# Patient Record
Sex: MALE | Race: WHITE | NOT HISPANIC OR LATINO | Employment: FULL TIME | URBAN - METROPOLITAN AREA
[De-identification: names, ages, dates, MRNs, and addresses within clinical notes are randomized per-mention and may not be internally consistent; named-entity substitution may affect disease eponyms.]

---

## 2023-09-18 ENCOUNTER — OFFICE VISIT (OUTPATIENT)
Dept: FAMILY MEDICINE CLINIC | Facility: CLINIC | Age: 28
End: 2023-09-18
Payer: COMMERCIAL

## 2023-09-18 ENCOUNTER — LAB (OUTPATIENT)
Dept: LAB | Facility: HOSPITAL | Age: 28
End: 2023-09-18
Payer: COMMERCIAL

## 2023-09-18 VITALS
OXYGEN SATURATION: 97 % | HEART RATE: 71 BPM | DIASTOLIC BLOOD PRESSURE: 70 MMHG | WEIGHT: 192 LBS | BODY MASS INDEX: 28.44 KG/M2 | HEIGHT: 69 IN | TEMPERATURE: 97.1 F | SYSTOLIC BLOOD PRESSURE: 124 MMHG

## 2023-09-18 DIAGNOSIS — Z13.220 SCREENING FOR HYPERLIPIDEMIA: ICD-10-CM

## 2023-09-18 DIAGNOSIS — Z76.89 ENCOUNTER TO ESTABLISH CARE WITH NEW DOCTOR: ICD-10-CM

## 2023-09-18 DIAGNOSIS — M25.561 RIGHT KNEE PAIN, UNSPECIFIED CHRONICITY: Primary | ICD-10-CM

## 2023-09-18 DIAGNOSIS — Z83.3 FAMILY HISTORY OF DIABETES MELLITUS: ICD-10-CM

## 2023-09-18 LAB
ALBUMIN SERPL-MCNC: 4.6 G/DL (ref 3.5–5.2)
ALBUMIN/GLOB SERPL: 1.9 G/DL
ALP SERPL-CCNC: 76 U/L (ref 39–117)
ALT SERPL W P-5'-P-CCNC: 28 U/L (ref 1–41)
ANION GAP SERPL CALCULATED.3IONS-SCNC: 10.8 MMOL/L (ref 5–15)
AST SERPL-CCNC: 37 U/L (ref 1–40)
BILIRUB SERPL-MCNC: 0.9 MG/DL (ref 0–1.2)
BUN SERPL-MCNC: 11 MG/DL (ref 6–20)
BUN/CREAT SERPL: 10.4 (ref 7–25)
CALCIUM SPEC-SCNC: 9.1 MG/DL (ref 8.6–10.5)
CHLORIDE SERPL-SCNC: 104 MMOL/L (ref 98–107)
CHOLEST SERPL-MCNC: 144 MG/DL (ref 0–200)
CO2 SERPL-SCNC: 25.2 MMOL/L (ref 22–29)
CREAT SERPL-MCNC: 1.06 MG/DL (ref 0.76–1.27)
EGFRCR SERPLBLD CKD-EPI 2021: 99.3 ML/MIN/1.73
GLOBULIN UR ELPH-MCNC: 2.4 GM/DL
GLUCOSE SERPL-MCNC: 90 MG/DL (ref 65–99)
HBA1C MFR BLD: 4.9 % (ref 4.8–5.6)
HDLC SERPL-MCNC: 35 MG/DL (ref 40–60)
LDLC SERPL CALC-MCNC: 93 MG/DL (ref 0–100)
LDLC/HDLC SERPL: 2.63 {RATIO}
POTASSIUM SERPL-SCNC: 4.3 MMOL/L (ref 3.5–5.2)
PROT SERPL-MCNC: 7 G/DL (ref 6–8.5)
SODIUM SERPL-SCNC: 140 MMOL/L (ref 136–145)
TRIGL SERPL-MCNC: 84 MG/DL (ref 0–150)
VLDLC SERPL-MCNC: 16 MG/DL (ref 5–40)

## 2023-09-18 PROCEDURE — 80053 COMPREHEN METABOLIC PANEL: CPT

## 2023-09-18 PROCEDURE — 83036 HEMOGLOBIN GLYCOSYLATED A1C: CPT

## 2023-09-18 PROCEDURE — 80061 LIPID PANEL: CPT

## 2023-09-18 NOTE — PROGRESS NOTES
New Patient Office Visit      Date: 2023  Patient Name: Hitesh Shoemaker  : 1995   MRN: [id number]     Chief Complaint:    Chief Complaint   Patient presents with    Knee Pain     Right   [facility]   X4 months       History of Present Illness: Hitesh Shoemaker is a 27 y.o. male who presents today as a new patient.  With Pravin, patient's wife.      Prev seeing pediatrician for pcp some time ago.    Patient reports right knee pain. Reports not sure why.  Reports has gained some weight in the last year.   Reports knee pain for 3-4 month.  Reports hurts with some movements.    Reports if he stands on the knee all day, it hurts worse.    Can give out at times.  Sharp quick pain.  Denies known fall or trauma.  Up to 7-8/10 and can hurt 15-20 seconds.  Reports pain is intermittent.      Weight 192 in office.  Reports he has been trying to gain weight.   Prev 145 weight per patient report.    Works as an .    Does a lot of walking.    Reports Aunt with hx of breast cancer.  Cousin on fathers side had lung cancer.        Subjective      Review of Systems:   Review of Systems   Constitutional:  Negative for chills and fever.   Gastrointestinal:  Negative for diarrhea, nausea and vomiting.   Musculoskeletal:  Positive for arthralgias (right knee pain).   Neurological:  Negative for seizures and syncope.   Psychiatric/Behavioral:  Negative for suicidal ideas.      Past Medical History: History reviewed. No pertinent past medical history.    Past Surgical History:   Past Surgical History:   Procedure Laterality Date    HEMANGIOMA EXCISION Right        Family History:   Family History   Problem Relation Age of Onset    Diabetes Mother     Hypertension Father     Hypertension Brother     Diabetes Maternal Grandmother        Social History:   Social History     Socioeconomic History    Marital status:    Tobacco Use    Smoking status: Never     Passive exposure: Never     "Smokeless tobacco: Never    Tobacco comments:     Vapes   Substance and Sexual Activity    Alcohol use: Defer     Comment: 2    Drug use: Never    Sexual activity: Yes     Partners: Female       Medications:   No current outpatient medications on file.     No current facility-administered medications for this visit.        Allergies:   Allergies   Allergen Reactions    Penicillins Unknown - High Severity     All types        Objective     Physical Exam:  Vital Signs:   Vitals:    09/18/23 0929   BP: 124/70   BP Location: Right arm   Patient Position: Sitting   Cuff Size: Adult   Pulse: 71   Temp: 97.1 °F (36.2 °C)   TempSrc: Infrared   SpO2: 97%   Weight: 87.1 kg (192 lb)   Height: 175.3 cm (69\")   PainSc: 0-No pain     Body mass index is 28.35 kg/m².   BMI cannot be calculated due to outdated height or weight values.  Please input a current height/weight in Vitals and re-renter BMIFOLLOWUP in Note to pull in correct documentation based on BMI range.       Physical Exam  Vitals and nursing note reviewed.   Constitutional:       Appearance: Normal appearance.   HENT:      Head: Normocephalic and atraumatic.   Neck:      Vascular: No carotid bruit.   Cardiovascular:      Rate and Rhythm: Normal rate and regular rhythm.      Heart sounds: Normal heart sounds. No murmur heard.  Pulmonary:      Effort: Pulmonary effort is normal.      Breath sounds: Normal breath sounds.   Abdominal:      General: Bowel sounds are normal.      Palpations: Abdomen is soft. There is no mass.      Tenderness: There is no abdominal tenderness.   Musculoskeletal:      Cervical back: Neck supple.      Right lower leg: No edema.      Left lower leg: No edema.        Legs:    Skin:     Coloration: Skin is not jaundiced or pale.      Findings: No erythema.   Neurological:      Mental Status: He is alert. Mental status is at baseline.   Psychiatric:         Mood and Affect: Mood normal.         Behavior: Behavior normal.         Body mass index is " 28.35 kg/m².      BMI is >= 25 and <30. (Overweight) The following options were offered after discussion;: exercise counseling/recommendations and nutrition counseling/recommendations      Procedures    POCT Results (if applicable):   No results found for this or any previous visit.    Measures:   Advanced Care Planning:   Patient does not have an advance directive, information provided.    Smoking Cessation:   Does not smoke      Assessment / Plan      Assessment/Plan:     1. Right knee pain, unspecified chronicity  Reports intermittent knee pain for several months.  Will get XR right knee.  Patient to take otc ibuprofen 600-800 mg up to tid.  Can use muscle rubs up to 4 times a day.    - XR Knee 3 View Right; Future    2. Family history of diabetes mellitus  Check A1C.    - Hemoglobin A1c; Future    3. Screening for hyperlipidemia  Check fasting lipid panel.    - Lipid Panel; Future    4. BMI 28.0-28.9,adult  Encouraged portion control and making good food choices.  Encouraged regular exercise.  Will monitor at follow-up.    5. Encounter to establish care with new doctor  Check cmp.    - Comprehensive Metabolic Panel; Future       Vaccine Counseling:      Follow Up:   Return in about 3 months (around 12/18/2023).      DO JENNIFER Rodgers

## 2023-09-18 NOTE — PATIENT INSTRUCTIONS
Fasting labs today.  XR right knee today.  Can take up to 600-800 mg ibuprofen up to 2-3 times a day as needed for knee pain.